# Patient Record
Sex: MALE | Race: OTHER | URBAN - METROPOLITAN AREA
[De-identification: names, ages, dates, MRNs, and addresses within clinical notes are randomized per-mention and may not be internally consistent; named-entity substitution may affect disease eponyms.]

---

## 2018-05-28 ENCOUNTER — EMERGENCY (EMERGENCY)
Facility: HOSPITAL | Age: 48
LOS: 0 days | Discharge: HOME | End: 2018-05-28
Attending: EMERGENCY MEDICINE
Payer: MEDICAID

## 2018-05-28 VITALS
SYSTOLIC BLOOD PRESSURE: 149 MMHG | OXYGEN SATURATION: 97 % | HEART RATE: 83 BPM | DIASTOLIC BLOOD PRESSURE: 89 MMHG | TEMPERATURE: 98 F | WEIGHT: 197.09 LBS | RESPIRATION RATE: 16 BRPM | HEIGHT: 67 IN

## 2018-05-28 PROCEDURE — 99283 EMERGENCY DEPT VISIT LOW MDM: CPT

## 2018-05-28 RX ORDER — MELOXICAM 15 MG/1
1 TABLET ORAL
Qty: 30 | Refills: 0 | OUTPATIENT
Start: 2018-05-28 | End: 2018-06-26

## 2018-05-28 RX ORDER — KETOROLAC TROMETHAMINE 30 MG/ML
30 SYRINGE (ML) INJECTION ONCE
Qty: 0 | Refills: 0 | Status: DISCONTINUED | OUTPATIENT
Start: 2018-05-28 | End: 2018-05-28

## 2018-05-28 RX ORDER — AZITHROMYCIN 500 MG/1
1 TABLET, FILM COATED ORAL
Qty: 4 | Refills: 0 | OUTPATIENT
Start: 2018-05-28 | End: 2018-05-31

## 2018-05-28 RX ORDER — AZITHROMYCIN 500 MG/1
500 TABLET, FILM COATED ORAL ONCE
Qty: 0 | Refills: 0 | Status: COMPLETED | OUTPATIENT
Start: 2018-05-28 | End: 2018-05-28

## 2018-05-28 RX ADMIN — Medication 30 MILLIGRAM(S): at 11:29

## 2018-05-28 RX ADMIN — AZITHROMYCIN 500 MILLIGRAM(S): 500 TABLET, FILM COATED ORAL at 11:29

## 2018-05-29 ENCOUNTER — EMERGENCY (EMERGENCY)
Facility: HOSPITAL | Age: 48
LOS: 0 days | Discharge: HOME | End: 2018-05-30
Attending: EMERGENCY MEDICINE
Payer: MEDICAID

## 2018-05-29 VITALS
OXYGEN SATURATION: 100 % | TEMPERATURE: 98 F | WEIGHT: 192.02 LBS | SYSTOLIC BLOOD PRESSURE: 145 MMHG | RESPIRATION RATE: 17 BRPM | HEIGHT: 67 IN | HEART RATE: 74 BPM | DIASTOLIC BLOOD PRESSURE: 88 MMHG

## 2018-05-29 DIAGNOSIS — M54.5 LOW BACK PAIN: ICD-10-CM

## 2018-05-29 DIAGNOSIS — N50.811 RIGHT TESTICULAR PAIN: ICD-10-CM

## 2018-05-29 PROCEDURE — 99283 EMERGENCY DEPT VISIT LOW MDM: CPT | Mod: 25

## 2018-05-29 RX ORDER — KETOROLAC TROMETHAMINE 30 MG/ML
60 SYRINGE (ML) INJECTION ONCE
Qty: 0 | Refills: 0 | Status: DISCONTINUED | OUTPATIENT
Start: 2018-05-29 | End: 2018-05-29

## 2018-05-29 NOTE — ED ADULT NURSE NOTE - OBJECTIVE STATEMENT
Patient complaining of lower back pain , testicle pain and nasal congestion since this morning, denies fever, discharge

## 2018-05-29 NOTE — ED ADULT NURSE NOTE - CHIEF COMPLAINT QUOTE
" I came here yesterday, I have pain in my lower back and the pain feels stronger, I feel pain in my testicles now" denies penile discharge

## 2018-05-29 NOTE — ED ADULT TRIAGE NOTE - CHIEF COMPLAINT QUOTE
" I came here yesterday, I have pain in my lower back and the pain feels stronger, I feel pain in my testicles now" " I came here yesterday, I have pain in my lower back and the pain feels stronger, I feel pain in my testicles now" denies penile discharge

## 2018-05-30 VITALS
HEART RATE: 75 BPM | SYSTOLIC BLOOD PRESSURE: 145 MMHG | DIASTOLIC BLOOD PRESSURE: 87 MMHG | OXYGEN SATURATION: 100 % | RESPIRATION RATE: 18 BRPM

## 2018-05-30 RX ORDER — IBUPROFEN 200 MG
1 TABLET ORAL
Qty: 15 | Refills: 0 | OUTPATIENT
Start: 2018-05-30 | End: 2018-06-03

## 2018-05-30 RX ADMIN — Medication 60 MILLIGRAM(S): at 00:11

## 2018-05-30 RX ADMIN — Medication 60 MILLIGRAM(S): at 01:03

## 2018-05-30 NOTE — ED PROVIDER NOTE - OBJECTIVE STATEMENT
Pertinent PMH/PSH/FHx/SHx and Review of Systems contained within:  49 yo m with no significant PMH presents in ED c/o right lower back pain that radiates to the right groin. Patient seen in ED for same symptoms yesterday and was diagnosed with muscle strain. Patient returns because he reports that he was unable to sleep last night due to pain. 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 No aggravating or relieving factors, No fever/chills, No photophobia/eye pain/changes in vision, No ear pain/sore throat/dysphagia, No chest pain/palpitations, no SOB/cough/wheeze/stridor, No abdominal pain, No N/V/D, no dysuria/frequency/discharge, No neck pain, no rash, no changes in neurological status/function.

## 2018-05-30 NOTE — ED PROVIDER NOTE - MEDICAL DECISION MAKING DETAILS
Patient received toradol. Patient also educated. he currently feels well and is being discharged with care instructions. patient is to follow up with pmd and/or ortho.
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
## 2018-05-31 DIAGNOSIS — J40 BRONCHITIS, NOT SPECIFIED AS ACUTE OR CHRONIC: ICD-10-CM

## 2018-05-31 DIAGNOSIS — R09.81 NASAL CONGESTION: ICD-10-CM
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
## 2018-05-31 DIAGNOSIS — S39.012A STRAIN OF MUSCLE, FASCIA AND TENDON OF LOWER BACK, INITIAL ENCOUNTER: ICD-10-CM

## 2020-04-19 NOTE — ED ADULT NURSE NOTE - PMH
No pertinent past medical history
Verbalized Understanding/Simple: Patient demonstrates quick and easy understanding
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
